# Patient Record
Sex: MALE | Race: WHITE | NOT HISPANIC OR LATINO | Employment: FULL TIME | URBAN - METROPOLITAN AREA
[De-identification: names, ages, dates, MRNs, and addresses within clinical notes are randomized per-mention and may not be internally consistent; named-entity substitution may affect disease eponyms.]

---

## 2019-09-30 PROBLEM — E11.9 TYPE 2 DIABETES MELLITUS: Status: ACTIVE | Noted: 2017-03-17

## 2019-09-30 PROBLEM — I10 ESSENTIAL HYPERTENSION: Status: ACTIVE | Noted: 2017-03-17

## 2021-08-11 DIAGNOSIS — U07.1 COVID-19 VIRUS DETECTED: ICD-10-CM

## 2023-05-04 ENCOUNTER — PATIENT OUTREACH (OUTPATIENT)
Dept: ADMINISTRATIVE | Facility: OTHER | Age: 48
End: 2023-05-04

## 2023-05-04 NOTE — PROGRESS NOTES
CHW - Initial Contact    This Community Health Worker completed the Social Determinant of Health questionnaire with patient during clinic visit today.    Pt identified barriers of most importance are: None were mentioned during this time, but pt was interested in follow up.   Follow up required: Yes  Follow-up Outreach - Due: 5/18/2023    Best to call after 3:30 pm

## 2023-06-07 ENCOUNTER — PATIENT OUTREACH (OUTPATIENT)
Dept: ADMINISTRATIVE | Facility: OTHER | Age: 48
End: 2023-06-07

## 2024-01-30 ENCOUNTER — PATIENT MESSAGE (OUTPATIENT)
Dept: ADMINISTRATIVE | Facility: HOSPITAL | Age: 49
End: 2024-01-30

## 2024-01-30 ENCOUNTER — PATIENT OUTREACH (OUTPATIENT)
Dept: ADMINISTRATIVE | Facility: HOSPITAL | Age: 49
End: 2024-01-30

## 2024-07-10 PROBLEM — M79.2 NEUROPATHIC PAIN: Status: ACTIVE | Noted: 2024-07-10

## 2024-07-10 PROBLEM — F41.9 ANXIETY AND DEPRESSION: Status: ACTIVE | Noted: 2024-07-10

## 2024-07-10 PROBLEM — E78.5 HYPERLIPIDEMIA: Status: ACTIVE | Noted: 2024-07-10

## 2024-07-10 PROBLEM — F32.A ANXIETY AND DEPRESSION: Status: ACTIVE | Noted: 2024-07-10

## 2024-10-17 PROBLEM — L02.91 ABSCESS: Status: ACTIVE | Noted: 2024-10-17

## 2024-11-13 ENCOUNTER — PATIENT OUTREACH (OUTPATIENT)
Dept: ADMINISTRATIVE | Facility: HOSPITAL | Age: 49
End: 2024-11-13

## 2024-12-10 ENCOUNTER — PATIENT OUTREACH (OUTPATIENT)
Dept: ADMINISTRATIVE | Facility: HOSPITAL | Age: 49
End: 2024-12-10

## 2024-12-10 DIAGNOSIS — E11.9 TYPE 2 DIABETES MELLITUS WITHOUT COMPLICATION, WITHOUT LONG-TERM CURRENT USE OF INSULIN: Primary | ICD-10-CM

## 2024-12-16 ENCOUNTER — OUTPATIENT CASE MANAGEMENT (OUTPATIENT)
Dept: ADMINISTRATIVE | Facility: OTHER | Age: 49
End: 2024-12-16

## 2024-12-16 NOTE — LETTER
Ashu Garrido  [address]  [address]  [facility] [address]      Dear Ashu Garrido,     I am writing from the Outpatient Care Management Department at [facility]. I received a referral  Eula Chamorro MD to contact you regarding any needs you may have. I was unable to reach you by phone. Please contact me for assistance.     I can be reached at [phone] Monday thru Friday from 8:00am to 4:30pm.      Additionally, [facility] also has a program with a nurse available 24/7 to answer questions or provide medical advice. [facility] can be reached at [phone].      Sincerely,          Joseline Guillermo, [facility]  Outpatient Care Management

## 2024-12-30 NOTE — PROGRESS NOTES
Outpatient Care Management  Patient Not Qualified    Patient: Ashu Garrido  MRN:  [id number]  Date of Service:  12/30/2024  Completed by:  Joseline Guillermo LMSW    Chief Complaint   Patient presents with    Case Closure       Patient Summary      SW contacted Pt @ [phone] attempts X3, no success with phone calls. CR sent  [facility] Missed Attempt Enrollment Social Work (Mail) on 12/20/2024. SW will remain available as needed.           Reason Not Qualified:  Unable to reach      Joseline Guillermo [facility]  [phone]